# Patient Record
Sex: FEMALE | Race: WHITE | NOT HISPANIC OR LATINO | Employment: UNEMPLOYED | URBAN - METROPOLITAN AREA
[De-identification: names, ages, dates, MRNs, and addresses within clinical notes are randomized per-mention and may not be internally consistent; named-entity substitution may affect disease eponyms.]

---

## 2017-07-27 ENCOUNTER — COMMUNICATION - HEALTHEAST (OUTPATIENT)
Dept: SCHEDULING | Facility: CLINIC | Age: 32
End: 2017-07-27

## 2019-03-26 ENCOUNTER — HOSPITAL ENCOUNTER (EMERGENCY)
Facility: CLINIC | Age: 34
Discharge: HOME | End: 2019-03-26
Attending: EMERGENCY MEDICINE | Admitting: EMERGENCY MEDICINE
Payer: MEDICAID

## 2019-03-26 VITALS
HEART RATE: 91 BPM | DIASTOLIC BLOOD PRESSURE: 48 MMHG | OXYGEN SATURATION: 99 % | BODY MASS INDEX: 27.56 KG/M2 | TEMPERATURE: 98 F | HEIGHT: 61 IN | SYSTOLIC BLOOD PRESSURE: 124 MMHG | WEIGHT: 146 LBS

## 2019-03-26 DIAGNOSIS — F41.9 ANXIETY: ICD-10-CM

## 2019-03-26 PROCEDURE — 90791 PSYCH DIAGNOSTIC EVALUATION: CPT

## 2019-03-26 PROCEDURE — 99285 EMERGENCY DEPT VISIT HI MDM: CPT | Mod: 25

## 2019-03-26 RX ORDER — QUETIAPINE FUMARATE 100 MG/1
100 TABLET, FILM COATED ORAL 2 TIMES DAILY
COMMUNITY
End: 2020-01-17

## 2019-03-26 RX ORDER — DIVALPROEX SODIUM 125 MG/1
125 TABLET, DELAYED RELEASE ORAL 3 TIMES DAILY
COMMUNITY
End: 2020-01-17

## 2019-03-26 RX ORDER — GABAPENTIN 100 MG/1
100 CAPSULE ORAL 3 TIMES DAILY
COMMUNITY
End: 2020-01-17

## 2019-03-26 RX ORDER — LURASIDONE HYDROCHLORIDE 40 MG/1
TABLET, FILM COATED ORAL DAILY
COMMUNITY
End: 2020-01-17

## 2019-03-26 SDOH — HEALTH STABILITY: MENTAL HEALTH: HOW OFTEN DO YOU HAVE A DRINK CONTAINING ALCOHOL?: NEVER

## 2019-03-26 ASSESSMENT — ENCOUNTER SYMPTOMS
CHILLS: 0
NERVOUS/ANXIOUS: 1
DYSPHORIC MOOD: 1
COUGH: 0
FEVER: 0

## 2019-03-26 ASSESSMENT — MIFFLIN-ST. JEOR: SCORE: 1294.63

## 2019-03-26 NOTE — ED PROVIDER NOTES
"  History     Chief Complaint:  Psychiatric Evaluation    HPI   Marcy Oh is a 33 year old female with a history of substance abuse and seizures who presents to the ED for a psychiatric evalaution. 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 This program has been helping her substance abuse, though she has recently noted over the past few weeks she is having increased anxiety and depression, where she states that she \"just doesn't care\" anymore. This evening, she was speaking her her therapist about this, and she referred the patient to the ED for evaluation. Here in the ED, she adds that she does have a history of seizures, and this can occur with increased stress. She was admitted over a year ago to [facility] for mental health. She denies any suicidal ideations or other symptoms or concerns. She has no physical concerns. She has been compliant with her medications. Of note, she last used 6 months ago.     Allergies:  Colace    Medications:    Depakote  Gabapentin  Latuda  Seroquel    Past Medical History:    Seizures  Substance abuse  Depression  Anxiety    Past Surgical History:    Cholecystectomy    Family History:    No past pertinent family history.    Social History:  Current Smoker  Negative for alcohol use.  Previous meth use-6 months sober    Review of Systems   Constitutional: Negative for chills and fever.   HENT: Negative for congestion.    Respiratory: Negative for cough.    Psychiatric/Behavioral: Positive for dysphoric mood. Negative for suicidal ideas. The patient is nervous/anxious.    All other systems reviewed and are negative.      Physical Exam     Patient Vitals for the past 24 hrs:   BP Temp Pulse SpO2 Height Weight   03/26/19 1759 124/48 98  F (36.7  C) 91 99 % 1.549 m (5' 1\") 66.2 kg (146 lb)     Physical Exam  General: No distress.   Head: No signs of trauma.   Mouth/Throat: Oropharynx moist.   Eyes: Conjunctivae are normal. Pupils " are equal..   Neck: Normal range of motion.    Resp:No respiratory distress.   MSK: Normal range of motion. No obvious deformity.  Neuro: The patient is alert and interactive. MEEK. Speech normal. GCS 15  Skin: No lesion or sign of trauma noted.   Psych: normal mood and flat affect. behavior is normal.     Emergency Department Course     Emergency Department Course:  Nursing notes and vitals reviewed. (1834) I performed an exam of the patient as documented above.     (1845) I consulted with DEC regarding the patient's history and presentation here in the emergency department.    The patient was evaluated by DEC.    (1919) I consulted with DEC regarding the patient's history and presentation here in the emergency department. She feels the patient is safe to go home. She has set her up with a new therapist.     Findings and plan explained to the Patient. Patient discharged home with instructions regarding supportive care, medications, and reasons to return. The importance of close follow-up was reviewed.    I personally answered all related questions prior to discharge.    Impression & Plan      Medical Decision Making:  Marcy Oh is a 33 year old female who presents for evaluation of anxiety and depression.  There is a history of anxiety in the past and they are on medications. There is no signs at this point of a general medical problem causing anxiety (PE, hyperthyroidism, other metabolic derangement, infection, etc).  There are no signs of serious decompensation warranting psychiatric hospitalization or 72 hold (no suicidal or homicidal ideation, no danger to self).  Supportive outpatient management is therefore indicated.  I did have DEC evaluate the patient, their recommendations are noted in separate note. They as well agree with the above recommendations, and have set the patient up with a new therapy appointment.      Diagnosis:    ICD-10-CM    1. Anxiety F41.9      Disposition:  discharged to home with  DEC referrals    Scribe Disclosure:  I, Janelle Malachi, am serving as a scribe on 3/26/2019 at 6:10 PM to personally document services performed by Danny Castillo MD based on my observations and the provider's statements to me.     Janelle Ly  3/26/2019    EMERGENCY DEPARTMENT       Danny Castillo MD  03/27/19 0036

## 2019-03-26 NOTE — ED AVS SNAPSHOT
Emergency Department  [address] [place] [id number]  Phone:  [phone]  Fax:  [phone]                                    Marcy Oh   MRN: [id number]    Department:   Emergency Department   Date of Visit:  3/26/2019           After Visit Summary Signature Page    I have received my discharge instructions, and my questions have been answered. I have discussed any challenges I see with this plan with the nurse or doctor.    ..........................................................................................................................................  Patient/Patient Representative Signature      ..........................................................................................................................................  Patient Representative Print Name and Relationship to Patient    ..................................................               ................................................  Date                                   Time    ..........................................................................................................................................  Reviewed by Signature/Title    ...................................................              ..............................................  Date                                               Time          22EPIC Rev 08/18

## 2019-07-09 ENCOUNTER — OFFICE VISIT - HEALTHEAST (OUTPATIENT)
Dept: ADDICTION MEDICINE | Facility: CLINIC | Age: 34
End: 2019-07-09

## 2019-07-09 DIAGNOSIS — F15.20 AMPHETAMINE USE DISORDER, SEVERE (H): ICD-10-CM

## 2019-07-16 ENCOUNTER — COMMUNICATION - HEALTHEAST (OUTPATIENT)
Dept: ADDICTION MEDICINE | Facility: CLINIC | Age: 34
End: 2019-07-16

## 2020-01-17 ENCOUNTER — OFFICE VISIT (OUTPATIENT)
Dept: FAMILY MEDICINE | Facility: CLINIC | Age: 35
End: 2020-01-17
Payer: COMMERCIAL

## 2020-01-17 VITALS
HEIGHT: 61 IN | OXYGEN SATURATION: 97 % | BODY MASS INDEX: 23.41 KG/M2 | SYSTOLIC BLOOD PRESSURE: 109 MMHG | DIASTOLIC BLOOD PRESSURE: 62 MMHG | WEIGHT: 124 LBS | TEMPERATURE: 97.7 F | RESPIRATION RATE: 18 BRPM | HEART RATE: 91 BPM

## 2020-01-17 DIAGNOSIS — F41.9 ANXIETY: ICD-10-CM

## 2020-01-17 DIAGNOSIS — F31.77 BIPOLAR DISORDER, IN PARTIAL REMISSION, MOST RECENT EPISODE MIXED (H): ICD-10-CM

## 2020-01-17 DIAGNOSIS — F90.9 ATTENTION DEFICIT HYPERACTIVITY DISORDER (ADHD), UNSPECIFIED ADHD TYPE: ICD-10-CM

## 2020-01-17 DIAGNOSIS — G40.909 SEIZURE DISORDER (H): Primary | ICD-10-CM

## 2020-01-17 LAB
ALBUMIN SERPL-MCNC: 4.4 G/DL (ref 3.4–5)
ALP SERPL-CCNC: 66 U/L (ref 40–150)
ALT SERPL W P-5'-P-CCNC: 15 U/L (ref 0–50)
ANION GAP SERPL CALCULATED.3IONS-SCNC: 6 MMOL/L (ref 3–14)
AST SERPL W P-5'-P-CCNC: 10 U/L (ref 0–45)
BILIRUB SERPL-MCNC: 0.5 MG/DL (ref 0.2–1.3)
BUN SERPL-MCNC: 23 MG/DL (ref 7–30)
CALCIUM SERPL-MCNC: 10 MG/DL (ref 8.5–10.1)
CHLORIDE SERPL-SCNC: 105 MMOL/L (ref 94–109)
CO2 SERPL-SCNC: 27 MMOL/L (ref 20–32)
CREAT SERPL-MCNC: 0.84 MG/DL (ref 0.52–1.04)
ERYTHROCYTE [DISTWIDTH] IN BLOOD BY AUTOMATED COUNT: 12 % (ref 10–15)
GFR SERPL CREATININE-BSD FRML MDRD: 89 ML/MIN/{1.73_M2}
GLUCOSE SERPL-MCNC: 67 MG/DL (ref 70–99)
HCT VFR BLD AUTO: 41.6 % (ref 35–47)
HGB BLD-MCNC: 14 G/DL (ref 11.7–15.7)
MCH RBC QN AUTO: 31.8 PG (ref 26.5–33)
MCHC RBC AUTO-ENTMCNC: 33.7 G/DL (ref 31.5–36.5)
MCV RBC AUTO: 95 FL (ref 78–100)
PLATELET # BLD AUTO: 239 10E9/L (ref 150–450)
POTASSIUM SERPL-SCNC: 4.2 MMOL/L (ref 3.4–5.3)
PROT SERPL-MCNC: 8.2 G/DL (ref 6.8–8.8)
RBC # BLD AUTO: 4.4 10E12/L (ref 3.8–5.2)
SODIUM SERPL-SCNC: 138 MMOL/L (ref 133–144)
VALPROATE SERPL-MCNC: 108 MG/L (ref 50–100)
WBC # BLD AUTO: 6.3 10E9/L (ref 4–11)

## 2020-01-17 PROCEDURE — 80053 COMPREHEN METABOLIC PANEL: CPT | Performed by: FAMILY MEDICINE

## 2020-01-17 PROCEDURE — 85027 COMPLETE CBC AUTOMATED: CPT | Performed by: FAMILY MEDICINE

## 2020-01-17 PROCEDURE — 80164 ASSAY DIPROPYLACETIC ACD TOT: CPT | Performed by: FAMILY MEDICINE

## 2020-01-17 PROCEDURE — 36415 COLL VENOUS BLD VENIPUNCTURE: CPT | Performed by: FAMILY MEDICINE

## 2020-01-17 PROCEDURE — 99203 OFFICE O/P NEW LOW 30 MIN: CPT | Performed by: FAMILY MEDICINE

## 2020-01-17 RX ORDER — ATOMOXETINE 10 MG/1
10 CAPSULE ORAL DAILY
Qty: 30 CAPSULE | Refills: 0 | Status: SHIPPED | OUTPATIENT
Start: 2020-01-17

## 2020-01-17 RX ORDER — LURASIDONE HYDROCHLORIDE 40 MG/1
40 TABLET, FILM COATED ORAL DAILY
Qty: 60 TABLET | Refills: 0 | Status: SHIPPED | OUTPATIENT
Start: 2020-01-17

## 2020-01-17 RX ORDER — DIVALPROEX SODIUM 500 MG/1
1500 TABLET, DELAYED RELEASE ORAL AT BEDTIME
Qty: 60 TABLET | Refills: 0 | Status: SHIPPED | OUTPATIENT
Start: 2020-01-17

## 2020-01-17 RX ORDER — VENLAFAXINE 25 MG/1
TABLET ORAL
COMMUNITY
Start: 2020-01-16

## 2020-01-17 RX ORDER — DIVALPROEX SODIUM 500 MG/1
TABLET, DELAYED RELEASE ORAL
COMMUNITY
Start: 2020-01-16 | End: 2020-01-17

## 2020-01-17 RX ORDER — HYDROXYZINE PAMOATE 25 MG/1
25 CAPSULE ORAL 3 TIMES DAILY PRN
Qty: 60 CAPSULE | Refills: 0 | Status: SHIPPED | OUTPATIENT
Start: 2020-01-17

## 2020-01-17 ASSESSMENT — MIFFLIN-ST. JEOR: SCORE: 1189.84

## 2020-01-17 NOTE — PROGRESS NOTES
"Subjective     Marcy Oh is a 34 year old female who presents to clinic today for the following health issues:    HPI   Patient is requesting depakote instead of levetiracetam 500MG.  34-year-old with past medical history of depression, anxiety, ADHD, bipolar disorder, seizure disorder.  She presents to the clinic to establish care.  She was recently incarcerated and prior to her incarceration she was taking 1500 mg of Depakote at bedtime.  She was also taking Latuda 40 mg once daily.  During her incarceration, she found out that she was pregnant and was switched from Depakote to Keppra.  The patient had a spontaneous  while she was incarcerated and desires to switch back to Depakote.  She does not have the desire to get pregnant anytime soon.    Also, taking Latuda for bipolar disorder.     Also, she was taking hydroxyzine once daily for anxiety.  She was started on Effexor on 2020 for anxiety and depression.    Medical history is also significant for ADHD.  She was previously taking Adderall but because she resides in a drug treatment program she desires to discuss other options for ADHD.    Keppra made her sick. Dry mouth and dizzy.     There is no problem list on file for this patient.    Past Surgical History:   Procedure Laterality Date     CHOLECYSTECTOMY         Social History     Tobacco Use     Smoking status: Current Some Day Smoker     Smokeless tobacco: Never Used   Substance Use Topics     Alcohol use: Not Currently     Frequency: Never     No family history on file.        Reviewed and updated as needed this visit by Provider         Review of Systems   ROS COMP: Constitutional, HEENT, cardiovascular, pulmonary, gi and gu systems are negative, except as otherwise noted.      Objective    /62   Pulse 91   Temp 97.7  F (36.5  C) (Oral)   Resp 18   Ht 1.549 m (5' 1\")   Wt 56.2 kg (124 lb)   SpO2 97%   BMI 23.43 kg/m    Body mass index is 23.43 kg/m .  Physical Exam "   GENERAL: healthy, alert and no distress  NECK: no adenopathy, no asymmetry, masses, or scars and thyroid normal to palpation  RESP: lungs clear to auscultation - no rales, rhonchi or wheezes  CV: regular rate and rhythm, normal S1 S2, no S3 or S4, no murmur, click or rub, no peripheral edema and peripheral pulses strong  ABDOMEN: soft, nontender, no hepatosplenomegaly, no masses and bowel sounds normal  PSYCH: mentation appears normal, affect normal/bright    Diagnostic Test Results:  Labs reviewed in Epic  Results for orders placed or performed in visit on 01/17/20 (from the past 24 hour(s))   CBC with platelets   Result Value Ref Range    WBC 6.3 4.0 - 11.0 10e9/L    RBC Count 4.40 3.8 - 5.2 10e12/L    Hemoglobin 14.0 11.7 - 15.7 g/dL    Hematocrit 41.6 35.0 - 47.0 %    MCV 95 78 - 100 fl    MCH 31.8 26.5 - 33.0 pg    MCHC 33.7 31.5 - 36.5 g/dL    RDW 12.0 10.0 - 15.0 %    Platelet Count 239 150 - 450 10e9/L         Assessment & Plan       ICD-10-CM    1. Seizure disorder (H) G40.909 Valproic acid     divalproex sodium delayed-release (DEPAKOTE) 500 MG DR tablet     Comprehensive metabolic panel     CBC with platelets   2. Bipolar disorder, in partial remission, most recent episode mixed (H) F31.77 lurasidone (LATUDA) 40 MG TABS tablet     Comprehensive metabolic panel     CBC with platelets   3. Anxiety F41.9 hydrOXYzine (VISTARIL) 25 MG capsule   4. Attention deficit hyperactivity disorder (ADHD), unspecified ADHD type F90.9 atomoxetine (STRATTERA) 10 MG capsule     Advised to schedule an appointment with our MTM team for medication review.     Return in about 1 month (around 2/17/2020).    Erin Case MD  [facility]

## 2020-01-20 NOTE — RESULT ENCOUNTER NOTE
Please call.     Labs reviewed.   Dear Marcy,   Your Valproic acid level is slightly above goal. Please continue with the current dose and we will repeat your labs after 2 weeks.     Erin Case MD

## 2021-05-30 NOTE — PROGRESS NOTES
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 \"I learned a lot, coping skills, patience.\"Not helpful- \"they didn't help with transitioning back into the world.\"    4. Support group participation: Have you/do you attend support group meetings to reduce/stop your alcohol/drug use? How recently? What was your experience? Are you willing to restart? If the person has not participated, is he or she willing?     Patient endorses attending a meeting every Monday night.     5. What would assist you in staying sober/straight?     \"Having my kids.\"   \"Being on the right medication.\"  \"Getting the help that I need.\"     Dimension V Ratings   Relapse/Continued Use/Continued problem potential - The placing authority must use the criteria in Dimension V to determine a client s relapse, continued use, and continued problem potential.   RISK DESCRIPTIONS - Severity rating:  3  Conrad has poor recognition and understanding of relapse and recidivism issues and displays moderately high vulnerability for further substance use or mental health problems.  Client has few coping skills and rarely applies coping skills.    REASONS SEVERITY WAS ASSIGNED - (What information did the person provide that indicates his or her " understanding of relapse issues? What about the person s experience indicates how prone he or she is to relapse? What coping skills does the person have that decrease relapse potential?)     Patient denies use since May, however collateral indicates more recent use. Patient's minimization of use appears to indicate a lack of insight into her use, which could indicate an increased risk of relapse at this time. Patient does endorse recent treatment experience, so she should have some knowledge and understanding of addiction and recovery. Patient does appear to lack insight into her use and the consequences it has caused her. Patient appears to current be at an increased risk of relapse due to her lack of supportive housing, and overall lack of support for sobriety at this time.           DIMENSION VI - Recovery Environment   1. Are you employed/attending school? Tell me about that.     Patient is not currently working. She last worked in April as a  at [employer].   She reports that she is currently looking for a job, but everything she had was stolen or thrown away when she was kicked out of her sober house, and went to detention.     2A. Describe a typical day; evening for you. Work, school, social, leisure, volunteer, spiritual practices. Include time spent obtaining, using, recovering from drugs or alcohol. (DSM)     Patient reports that a typical day consists of;   Non-using day; wake up, look on phone to see if her color is called for probation, look into programs to get into, try to get into treatment and stay focused, watch her sister's kids.   Using day; go to [employer] a lot, chasing a man and doing whatever he told her to do, figure out how to get drugs and use throughout the day.     2B. How often do you spend more time than you planned using or use more than you planned? (DSM)     Patient endorses spending more time than planned every time she used.   She denies using more than planned.     3. How  important is using to your social connections? Do many of your family or friends use?     Patient reports that all of her friends are currently in recovery.   She states that all of her family still use and drink.     4A. Are you currently in a significant relationship?  no  4B.  If yes, how long?    4C. Sexual Orientation:  Heterosexual    5A. Who do you live with? Dad, sister, sister's SO, and sister's  children.  5B. Tell me about their alcohol/drug use and mental health issues. Sister uses meth, dad drinks.  5C. Are you concerned for your safety there? Yes; doesn't feel physically unsafe but fears for her sobriety.   5D. Are you concerned about the safety of anyone else who lives with you? no    6A. Do you have children who live with you? no  If the person lives with children, ask follow-up questions to determine the person's relationship and responsibility, both legal and care giving; and what arrangements are made for supervision for the children when the person is not available.      6B. Do you have children who do not live with you?  yes, Explain:  2 sons (10, 11) daughter (4)- currently living with their dad for over a year while she has been in treatment.   If yes, ask follow up questions to learn where the children are, who has custody and what the person't relaltionship and responsibility is with these children and what hopes the person has for his or her future with these children.    7A. Who supports you in making changes in your alcohol or drug use? What are they willing to do to support you? Who is upset or angry about you making changes in your alcohol or drug use? How big a problem is this for you?      Sober network    7B. This table is provided to record information about the person s relationships and available support It is not necessary to ask each item; only to get a comprehensive picture of their support system.  How often can you count on the following people when you need someone?    Partner / Spouse    Parent(s)/Aunt(s)/Uncle(s)/Grandparents Never supportive   Sibling(s)/Cousin(s) Never supportive   Child(ale) Always supportive   Other relative(s) Usually supportive   Friend(s)/neighbor(s) Always supportive   Child(ale) s father(s)/mother(s) Never supportive   Support group member(s) Always supportive   Community of baljit members Always supportive   /counselor/therapist/healer    Other (specify)  Probation Always supportive     8A. What is your current living situation?     Patient is currently living with her father.   Prior to going to halfway she states that she was living in a sober house through [facility].     8B. What is your long term plan for where you will be living?    Patient reports that she is unsure.   She hopes to go to outpatient with sober housing.     8C. Tell me about your living environment/neighborhood? Ask enough follow up questions to determine safety, criminal activity, availability of alcohol and drugs, supportive or antagonistic to the person making changes.      Patient states that the neighborhood she currently lives in is safe, but use is present at home.     9. Criminal justice history: Gather current/recent history and any significant history related to substance use--Arrests? Convictions? Circumstances? Alcohol or drug involvement? Sentences? Still on probation or parole? Expectations of the court? Current court order? Any sex offenses - lifetime? What level? (DSM)    Patient reports charges for;  Possession of a large amount of marijuana  Theft  Lying to police officers  Burglary  Receiving stolen merchandise  Theft by angelia     10. What obstacles exist to participating in treatment? (Time off work, childcare, funding, transportation, pending halfway time, living situation)    Not being able to take care of her children if needed.     Dimension VI Ratings   Recovery environment - The placing authority must use the criteria in Dimension VI  to determine a client s recovery environment.   RISK DESCRIPTIONS - Severity ratin  Client has (A) Chronically antagonistic significant other, living environment, family, peer gorup or long-term criminal justice involvement that is harmful to recovery or treatment progress, or (B) Client has an actively antagonistic significant other, family, work, or living environment with immediate threat to the client's safety and well-being.      REASONS SEVERITY WAS ASSIGNED - (What support does the person have for making changes? What structure/stability does the person have in his or her daily life that willincrease the likelihood that changes can be sustained? What problems exist in the person s environment that will jeopardize getting/staying clean and sober?)     Patient is currently unemployed, and appears to lack sober hobbies at this time, so she appears to greatly lack any structured or meaningful activity in her daily life. Patient reports she has been living with her father, where drug and alcohol use are present, and so it does not appear supportive of her sobriety currently, and may be antagonistic of he recovery. Patient reports sober friends, but otherwise appears to greatly lack support for her sobriety currently. Patient endorses legal involvement, including probation in 3 counties.                Client Choice/Exceptions     Would you like services specific to language, age, gender, culture, Congregational preference, race, ethnicity, sexual orientation or disability?  no    If yes, specify:      What particular treatment choices and options would you like to have?  [place] with [facility] &[place]    Do you have a preference for a particular treatment program?  [place] if able        DSM-V Criteria for Substance Abuse  Instructions  Determine whether the client currently meets the criteria for a Substance Use Disorder using the diagnostic criteria in the DSM-V, pp. 481-589. Current means during the most recent 12  months outside a facility that controls access to substances.    Category of substance Severity ICD-10 Code/DSM V Code   []  Alcohol Use Disorder [] Mild  [] Moderate  [] Severe [] (F10.10) (305.00)  [] (F10.20) (303.90)  [] (F10.20) (303.90)   []  Cannabis Use Disorder [] Mild  [] Moderate  [] Severe [] (F12.10) (305.20)  [] (F12.20) (304.30)  [] (F12.20) (304.30)   [] Hallucinogen Use Disorder [] Mild  [] Moderate  [] Severe [] (F16.10) (305.30)  [] (F16.20) (304.50)  [] (F16.20) (304.50)   []  Inhalant Use Disorder [] Mild  [] Moderate  [] Severe [] (F18.10) (305.90)  [] (F18.20) (304.60)  [] (F18.20) (304.60)   []  Opioid Use Disorder [] Mild  [] Moderate  [] Severe [] (F11.10) (305.50)  [] (F11.20) (304.00)  [] (F11.20) (304.00)   []  Sedative, Hypnotic, or Anxiolytic Use Disorder [] Mild  [] Moderate  [] Severe [] (F13.10) (305.40)   [] (F13.20) (304.10)  [] (F13.20) (304.10)   [x]  Stimulant Related Disorders [] Mild  [] Moderate  [x] Severe [] (F15.10) (305.70) Amphetamine type substance  [] (F14.10) (305.60) Cocaine  [] (F15.10) (305.70) Other or unspecified stimulant  [] (F15.20) (304.40) Amphetamine type substance  [] (F14.20) (304.20) Cocaine  [] (F15.20) (304.40) Other or unspecified stimulant  [x] (F15.20) (304.40) Amphetamine type substance  [] (F14.20) (304.20) Cocaine  [] (F15.20) (304.40) Other or unspecified stimulant   []  Tobacco use Disorder [] Mild  [] Moderate  [] Severe [] (Z72.0) (305.1)  [] (F17.200) (305.1)  [] (F17.200) (305.1)   []  Other (or unknown) Substance Use Disorder [] Mild  [] Moderate  [] Severe [] (F19.10) (305.90)  [] (F19.20) (304.90)  [] (F19.20) (304.90)       Suggested Level of Care Necessary for Recovery  []  Inpatient  []  Extended Care [x]  Residential [x]  Outpatient with Board and [place]  []  None            Collateral Contact Summary   Number of contacts made:  3 attempted  Contact with referring person:  yes     If court related records were reviewed, summarize  here:  Public record reviewed to verify criminal record.      []   Information from collateral contacts supported/largely agreed with information from the client and associated risk ratings.   [x]   Information from collateral contacts was significantly different from information from the client and lead to different risk ratings.      Summarize new information here:      Rule 25 Assessment Summary and Plan   's Recommendation    Based on the information gathered in this assessment and from collateral information, the client meets DSM IV criteria for Stimulant Use Disorder, Amphetamine Type, Severe (F15.20).  Patient is recommended to attend either a residential program or an Dual Diagnosis Intensive Outpatient program with [facility] and [place].   Patient to comply with all probation requirements and recommendations.   Patient to abstain from use of all non-prescribed mood altering substances.   This assessment can be updated with additional information within a 6 month period.      Collateral Contacts     Please duplicate this page for each contact.  If this includes information which is sensitive and not public, separate this page from the rest of the assessment before sharing.  Retain the page in the assessment file.   Name    Foster Hermosillo/[facility] Probation Relationship     Phone Number    [phone]- phone  [phone]- fax Releases    yes       Information Provided:      Foster provided the following information via voicemail on 7/9/19 @ 1531;     Patient is on probation with him for 3rd degree burglary from 2017.   Patient has never provided a UA to probation. She has missed 5 now, with the most recent missed UA being on 7/5/19.   He believes that the patient is using.   Patient was discharged from [facility] due to use and rule violations.   Staff from [facility] reported the patient being highly intoxicated, under the influence of alcohol at the time that she was kicked out.     There is a UA from 6/28/19 that was positive for alcohol, the day of her discharge.  On the verge of a probation violation.        Attempted to contact Foster 7/9/19 @ 6114. No answer, left message to return call with any information.     Collateral Contacts     Name    Mora Condon/[facility]   Relationship     Phone Number    [phone]- phone  [phone]- fax Releases    yes       Information Provided:      Mora reported the following on 7/9/19 @ 1520;    She has never met the patient before, she has only talked to her once, and so she does not currently have any information to provide.       Collateral Contacts     [facility]    [facility]   Relationship    Probation Phone Number    [phone]- phone  [phone]- faxed Releases    yes       Information Provided:      Service center provided the following on 7/9/19 @ 1533;    Patient is still in the intake process, so has not been assigned a specific agent since she has not yet been sentenced.       A problematic pattern of alcohol/drug use leading to clinically significant impairment or distress, as manifested by at least two of the following, occurring within a 12-month period:      Specify if: In early remission:  After full criteria for alcohol/drug use disorder were previously met, none of the criteria for alcohol/drug use disorder have been met for at least 3 months but for less than 12 months (with the exception that Criterion A4,  Craving or a strong desire or urge to use alcohol/drug  may be met).     In sustained remission:   After full criteria for alcohol use disorder were previously met, none of the criteria for alcohol/drug use disorder have been met at any time during a period of 12 months or longer (with the exception that Criterion A4,  Craving or strong desire or urge to use alcohol/drug  may be met).   Specify if:   This additional specifier is used if the individual is in an  environment where access to alcohol is restricted.    Mild: Presence of 2-3 symptoms  Moderate: Presence of 4-5 symptoms  Severe: Presence of 6 or more symptoms-AMPHETAMINE          Staff Name and Title:  RORY Ray    Date:  7/9/2019  Time:  9:15 AM

## 2021-08-14 ENCOUNTER — HEALTH MAINTENANCE LETTER (OUTPATIENT)
Age: 36
End: 2021-08-14

## 2021-10-09 ENCOUNTER — HEALTH MAINTENANCE LETTER (OUTPATIENT)
Age: 36
End: 2021-10-09

## 2022-09-11 ENCOUNTER — HEALTH MAINTENANCE LETTER (OUTPATIENT)
Age: 37
End: 2022-09-11

## 2023-10-07 ENCOUNTER — HEALTH MAINTENANCE LETTER (OUTPATIENT)
Age: 38
End: 2023-10-07

## 2024-02-24 ENCOUNTER — HEALTH MAINTENANCE LETTER (OUTPATIENT)
Age: 39
End: 2024-02-24